# Patient Record
Sex: MALE | Race: OTHER | Employment: FULL TIME | URBAN - METROPOLITAN AREA
[De-identification: names, ages, dates, MRNs, and addresses within clinical notes are randomized per-mention and may not be internally consistent; named-entity substitution may affect disease eponyms.]

---

## 2017-05-18 ENCOUNTER — APPOINTMENT (OUTPATIENT)
Dept: GENERAL RADIOLOGY | Age: 47
End: 2017-05-18
Attending: PHYSICIAN ASSISTANT
Payer: COMMERCIAL

## 2017-05-18 ENCOUNTER — HOSPITAL ENCOUNTER (OUTPATIENT)
Age: 47
Discharge: HOME | End: 2017-05-18
Payer: COMMERCIAL

## 2017-05-18 VITALS
TEMPERATURE: 98 F | HEART RATE: 73 BPM | OXYGEN SATURATION: 99 % | DIASTOLIC BLOOD PRESSURE: 88 MMHG | SYSTOLIC BLOOD PRESSURE: 129 MMHG | RESPIRATION RATE: 20 BRPM

## 2017-05-18 DIAGNOSIS — S60.212A CONTUSION OF LEFT WRIST, INITIAL ENCOUNTER: Primary | ICD-10-CM

## 2017-05-18 DIAGNOSIS — S60.812A ABRASION OF LEFT WRIST, INITIAL ENCOUNTER: ICD-10-CM

## 2017-05-18 PROCEDURE — 99213 OFFICE O/P EST LOW 20 MIN: CPT

## 2017-05-18 PROCEDURE — 90471 IMMUNIZATION ADMIN: CPT

## 2017-05-18 PROCEDURE — 73110 X-RAY EXAM OF WRIST: CPT | Performed by: PHYSICIAN ASSISTANT

## 2017-05-19 NOTE — ED PROVIDER NOTES
Patient Seen in: [facility] [place] [facility]    History   Patient presents with:  Laceration Abrasion (integumentary)    Stated Complaint: 2 day old left wrist laceration    HPI    41-year-old male who comes in today complaining of an injury to abnormality  Lungs:  Clear to auscultation bilaterally, respirations unlabored, no wheezing, rales or rhonchi   Heart:  Regular rate & rhythm, S1 and S2 normal, no murmurs, rubs, or gallops  Left wrist:   Multiple small abrasion scabbed over on left distal Order Specific Question: What is the Relevant Clinical Indication / Reason for Exam?  Answer: pain/injury  Tetanus-Diphth-Acell Pertussis (BOOSTRIX) injection 0.5 mL   Sig:     MDM     Reviewed infection risks with patient if any signs of infection or wr

## 2017-05-19 NOTE — ED INITIAL ASSESSMENT (HPI)
Here for eval of left wrist pain that started 2 days ago after hurting it when hit by a hand saw. Bruising noted to area. Sts that area is tender to the touch. Abrasions noted to area.

## 2018-02-06 ENCOUNTER — HOSPITAL ENCOUNTER (OUTPATIENT)
Age: 48
Discharge: HOME | End: 2018-02-06
Payer: COMMERCIAL

## 2018-02-06 VITALS
HEART RATE: 94 BPM | OXYGEN SATURATION: 98 % | WEIGHT: 176 LBS | DIASTOLIC BLOOD PRESSURE: 82 MMHG | RESPIRATION RATE: 20 BRPM | SYSTOLIC BLOOD PRESSURE: 145 MMHG | TEMPERATURE: 100 F | HEIGHT: 67 IN | BODY MASS INDEX: 27.62 KG/M2

## 2018-02-06 DIAGNOSIS — J11.1 INFLUENZA-LIKE SYNDROME: Primary | ICD-10-CM

## 2018-02-06 PROCEDURE — 99213 OFFICE O/P EST LOW 20 MIN: CPT

## 2018-02-06 PROCEDURE — 99214 OFFICE O/P EST MOD 30 MIN: CPT

## 2018-02-06 RX ORDER — IBUPROFEN 600 MG/1
600 TABLET ORAL ONCE
Status: COMPLETED | OUTPATIENT
Start: 2018-02-06 | End: 2018-02-06

## 2018-02-06 RX ORDER — BENZONATATE 200 MG/1
200 CAPSULE ORAL 3 TIMES DAILY PRN
Qty: 30 CAPSULE | Refills: 0 | Status: SHIPPED | OUTPATIENT
Start: 2018-02-06 | End: 2018-02-13

## 2018-02-06 RX ORDER — CODEINE PHOSPHATE AND GUAIFENESIN 10; 100 MG/5ML; MG/5ML
5 SOLUTION ORAL NIGHTLY PRN
Qty: 150 ML | Refills: 0 | Status: SHIPPED | OUTPATIENT
Start: 2018-02-06

## 2018-02-06 RX ORDER — ACETAMINOPHEN 500 MG
500 TABLET ORAL EVERY 6 HOURS PRN
COMMUNITY

## 2018-02-06 NOTE — ED PROVIDER NOTES
Patient Seen in: [facility]    History   Patient presents with:   Body ache and/or chills  Cough/URI: runny nose  Headache (neurologic)    Stated Complaint: flu symptoms x 3 days    HPI    Ruth Davis is a 48-year-old male who comes i posterior cervical adenopathy  Lungs: Clear to auscultation bilaterally, respirations unlabored. No wheezing, rales or rhonchi. Heart: NSR, S1, S2 present. No murmurs, rubs or gallops.   Skin: no rash         ED Course   Labs Reviewed - No data to display following up with his doctor- Bree White DO  - as instructed. The patient verbalized understanding of the discharge instructions and plan.

## 2018-02-06 NOTE — ED INITIAL ASSESSMENT (HPI)
Started Saturday with chills, then now with cough (more dry), runny nose & headache. Body aches on Saturday into Sunday. His child has been sick recently. Did not get a flu vaccine.

## 2021-04-15 ENCOUNTER — HOSPITAL ENCOUNTER (OUTPATIENT)
Age: 51
Discharge: HOME | End: 2021-04-15
Payer: COMMERCIAL

## 2021-04-15 VITALS
HEART RATE: 99 BPM | DIASTOLIC BLOOD PRESSURE: 80 MMHG | WEIGHT: 190 LBS | RESPIRATION RATE: 16 BRPM | SYSTOLIC BLOOD PRESSURE: 133 MMHG | TEMPERATURE: 100 F | BODY MASS INDEX: 30 KG/M2 | OXYGEN SATURATION: 96 %

## 2021-04-15 DIAGNOSIS — U07.1 COVID-19: Primary | ICD-10-CM

## 2021-04-15 PROCEDURE — 99212 OFFICE O/P EST SF 10 MIN: CPT

## 2021-04-15 PROCEDURE — 99213 OFFICE O/P EST LOW 20 MIN: CPT

## 2021-04-15 RX ORDER — IBUPROFEN 600 MG/1
600 TABLET ORAL ONCE
Status: COMPLETED | OUTPATIENT
Start: 2021-04-15 | End: 2021-04-15

## 2021-04-15 RX ORDER — ALBUTEROL SULFATE 90 UG/1
2 AEROSOL, METERED RESPIRATORY (INHALATION) EVERY 4 HOURS PRN
Qty: 1 INHALER | Refills: 0 | Status: SHIPPED | OUTPATIENT
Start: 2021-04-15 | End: 2021-05-15

## 2021-04-15 NOTE — ED INITIAL ASSESSMENT (HPI)
Patient reports he is having symptoms of corona virus. Patient reports he is having fever, and chills since yesterday.

## 2021-04-15 NOTE — ED PROVIDER NOTES
Patient Seen in: [facility] [place]      History   Patient presents with:  Fever    Stated Complaint: CHILLS/FEVER     HPI/Subjective:   HPI    Bonny Dakins is a 48-year-old male who presents today for possible COVID-19 infection.   He denies past medic Patient appears well-developed and well-nourished, non-toxic and in no acute distress. Actively wearing a mask. Head: Normocephalic and atraumatic. Cardiovascular: Normal rate, regular rhythm, normal heart sounds and intact distal pulses.       Pulmonar The patient is encouraged to return if any concerning symptoms arise. Additional verbal discharge instructions are given and discussed. Discharge medications are discussed.  The patient is in good condition throughout the visit today and remains so upon

## 2021-04-23 ENCOUNTER — HOSPITAL ENCOUNTER (OUTPATIENT)
Age: 51
Discharge: HOME | End: 2021-04-23
Payer: COMMERCIAL

## 2021-04-23 VITALS
HEART RATE: 66 BPM | DIASTOLIC BLOOD PRESSURE: 87 MMHG | TEMPERATURE: 99 F | OXYGEN SATURATION: 99 % | RESPIRATION RATE: 14 BRPM | SYSTOLIC BLOOD PRESSURE: 121 MMHG

## 2021-04-23 DIAGNOSIS — U07.1 COVID-19: Primary | ICD-10-CM

## 2021-04-23 PROCEDURE — 99212 OFFICE O/P EST SF 10 MIN: CPT

## 2021-04-23 NOTE — ED PROVIDER NOTES
Patient Seen in: [facility] [place]      History   Patient presents with:  Testing    Stated Complaint: covid test f/u    HPI/Subjective:   HPI    65-year-old male presents for testing to return to work, he was positive on 4/15/2021 and symptoms sounds. Musculoskeletal:      Cervical back: Neck supple. Skin:     General: Skin is warm and dry. Neurological:      Mental Status: He is alert.        ED Course     Labs Reviewed   RAPID SARS-COV-2 BY PCR - Abnormal; Notable for the following compon

## 2021-04-23 NOTE — ED INITIAL ASSESSMENT (HPI)
Pt presents requesting repeat covid test for work, first day of s/s 4/9, last day of fever 4/19, pt stating he feels fine

## 2021-08-14 ENCOUNTER — HOSPITAL ENCOUNTER (OUTPATIENT)
Age: 51
Discharge: HOME | End: 2021-08-14
Payer: COMMERCIAL

## 2021-08-14 VITALS
DIASTOLIC BLOOD PRESSURE: 86 MMHG | SYSTOLIC BLOOD PRESSURE: 112 MMHG | WEIGHT: 190 LBS | OXYGEN SATURATION: 98 % | HEIGHT: 71 IN | RESPIRATION RATE: 18 BRPM | BODY MASS INDEX: 26.6 KG/M2 | HEART RATE: 79 BPM | TEMPERATURE: 99 F

## 2021-08-14 DIAGNOSIS — S61.412A LACERATION OF LEFT HAND WITHOUT FOREIGN BODY, INITIAL ENCOUNTER: Primary | ICD-10-CM

## 2021-08-14 PROCEDURE — 12004 RPR S/N/AX/GEN/TRK7.6-12.5CM: CPT

## 2021-08-14 PROCEDURE — 99212 OFFICE O/P EST SF 10 MIN: CPT

## 2021-08-14 PROCEDURE — 99213 OFFICE O/P EST LOW 20 MIN: CPT

## 2021-08-14 NOTE — ED INITIAL ASSESSMENT (HPI)
Pt here after cutting left hand working on his car about 40 min PTA. Pt UTD on tetanus. Bleeding controlled.

## 2021-08-14 NOTE — ED PROVIDER NOTES
Patient Seen in: [facility]      History   No chief complaint on file.     Stated Complaint: left hand laceration    HPI/Subjective:   HPI    CHIEF COMPLAINT: Left hand laceration     HISTORY OF PRESENT ILLNESS: Patient is a 69-year-old ma Triage Vitals [08/14/21 1300]   /86   Pulse 79   Resp 18   Temp 98.5 °F (36.9 °C)   Temp src Temporal   SpO2 98 %   O2 Device None (Room air)       Current:/86   Pulse 79   Temp 98.5 °F (36.9 °C) (Temporal)   Resp 18   Ht 180.3 cm (5' 11\")   W Reviewed - No data to display       Tdap was current from 2017         MDM      This is a well-appearing 13-year-old male who presents for left hand laceration he sustained just prior to arrival.  Patient's laceration was successfully repaired here today.

## (undated) NOTE — ED AVS SNAPSHOT
[facility] [address] [place] [address] Drive,[address] Floor    [facility]    Phone:  [phone]    Fax:  [phone]           Lb Valdivia   MRN: [id number]    Department:  [facility]   Date of Visit:  5/18/2017 Ice: Apply cold compresses to the injured area. The area that is injured is inflammed. Inflammation causes warmth, so ice may give some relief. You may ice through a brace or ace wrap. Compression: Compression to the area will help control swelling. a detailed feedback survey mailed to them a week after the visit. If you receive this, we would really appreciate it if you could take the time to complete it. Thank you! You were examined and treated today on an urgent basis only.   This was not a manning [facility] ([address]) [facility] [address] [facility]. ([address]) [facility] [address] ([address] [facility] [facility]  Adina Junior ([facility] [address] FINDINGS:  No evidence of acute displaced fracture or dislocation. Normal mineralization. Unremarkable soft tissues. Relive     Sign up for [employer], your secure online medical record.   Relive will allow you to access patient instructions from

## (undated) NOTE — LETTER
Date & Time: 4/23/2021, 11:12 AM  Patient: Cora Cormier  Encounter Provider(s):    JEWEL Toscano       To Whom It May Concern:    Cora Cormier was seen and treated in our department on 4/23/2021.  He can return to work on 4/26/21, he has met

## (undated) NOTE — LETTER
[facility] [address]  [place] [address] [address]  [phone]            Patient: Magnus Hdez   YOB: 1970   Date of Visit: 4/15/2021       [facility],       April 15, 2021      En [address] [facility] [address] to enfermedad grave que requirió hospitalización) desde que iniciaron los síntomas y  • Abreu pasado al menos 24 horas desde que se redujo la fiebre sin el uso de medicamentos para bajar la fiebre y  • Los demás síntomas abreu lisa.   Las personas infectadas c

## (undated) NOTE — LETTER
Date & Time: 4/15/2021, 9:51 AM  Patient: Ike Collier  Encounter Provider(s):    Jesica Lin PA-C       To Whom It May Concern:    Ike Collier was seen and treated in our department on 4/15/2021.  He should not return to work until 4/26/21 or until f

## (undated) NOTE — LETTER
[address]  [place] [address]  [phone]            Patient: Howie Champion   YOB: 1970   Date of Visit: 4/15/2021       [facility],       April 15, 2021      En [address] [facility] [address] to enfermedad grave que requirió hospitalización) desde que iniciaron los síntomas y  • Abreu pasado al menos 24 horas desde que se redujo la fiebre sin el uso de medicamentos para bajar la fiebre y  • Los demás síntomas abreu lisa.   Las personas infectadas c